# Patient Record
Sex: FEMALE | URBAN - METROPOLITAN AREA
[De-identification: names, ages, dates, MRNs, and addresses within clinical notes are randomized per-mention and may not be internally consistent; named-entity substitution may affect disease eponyms.]

---

## 2024-12-04 ENCOUNTER — OFFICE VISIT (OUTPATIENT)
Dept: URGENT CARE | Age: 44
End: 2024-12-04
Payer: COMMERCIAL

## 2024-12-04 VITALS
WEIGHT: 260 LBS | HEART RATE: 105 BPM | RESPIRATION RATE: 16 BRPM | DIASTOLIC BLOOD PRESSURE: 88 MMHG | SYSTOLIC BLOOD PRESSURE: 160 MMHG | OXYGEN SATURATION: 98 % | HEIGHT: 64 IN | BODY MASS INDEX: 44.39 KG/M2 | TEMPERATURE: 98.7 F

## 2024-12-04 DIAGNOSIS — J06.9 VIRAL UPPER RESPIRATORY TRACT INFECTION: Primary | ICD-10-CM

## 2024-12-04 DIAGNOSIS — A08.4 VIRAL GASTROENTERITIS: ICD-10-CM

## 2024-12-04 DIAGNOSIS — R03.0 ELEVATED BLOOD PRESSURE READING WITHOUT DIAGNOSIS OF HYPERTENSION: ICD-10-CM

## 2024-12-04 DIAGNOSIS — R05.9 COUGH, UNSPECIFIED TYPE: ICD-10-CM

## 2024-12-04 LAB
POC BINAX EXPIRATION: 0
POC BINAX NOW COVID SERIAL NUMBER: 0
POC RAPID INFLUENZA A: NEGATIVE
POC RAPID INFLUENZA B: NEGATIVE
POC RAPID STREP: NEGATIVE
POC SARS-COV-2 AG BINAX: NORMAL

## 2024-12-04 RX ORDER — LEVOTHYROXINE SODIUM 150 UG/1
150 TABLET ORAL DAILY
COMMUNITY

## 2024-12-04 RX ORDER — PREDNISONE 20 MG/1
20 TABLET ORAL DAILY
Qty: 5 TABLET | Refills: 0 | Status: SHIPPED | OUTPATIENT
Start: 2024-12-04 | End: 2024-12-09

## 2024-12-04 RX ORDER — NORGESTIMATE AND ETHINYL ESTRADIOL 0.25-0.035
1 KIT ORAL DAILY
COMMUNITY

## 2024-12-04 RX ORDER — ALBUTEROL SULFATE 90 UG/1
2 INHALANT RESPIRATORY (INHALATION) EVERY 6 HOURS PRN
Qty: 18 G | Refills: 0 | Status: SHIPPED | OUTPATIENT
Start: 2024-12-04 | End: 2024-12-29

## 2024-12-04 RX ORDER — RISANKIZUMAB-RZAA 150 MG/ML
INJECTION SUBCUTANEOUS
COMMUNITY
Start: 2023-12-08

## 2024-12-04 NOTE — LETTER
December 4, 2024     Patient: Anjelica Velasquez   YOB: 1980   Date of Visit: 12/4/2024       To Whom It May Concern:    Anjelica Velasquez was seen in my clinic on 12/4/2024 at 9:20 am. Please excuse Anjelica for her absence from work on this day to make the appointment.    If you have any questions or concerns, please don't hesitate to call.         Sincerely,         Adalberto Hopkins,         CC: No Recipients

## 2024-12-04 NOTE — PATIENT INSTRUCTIONS
We advise close follow-up with primary care provider regarding acute upper respiratory symptoms as well as elevated blood pressure without diagnosis of hypertension. We advised seeking immediate emergency medical attention if symptoms fail to improve, worsen or any concerning symptoms arise. Patient voiced full understanding and agreement to plan.

## 2024-12-04 NOTE — PROGRESS NOTES
"Subjective   Patient ID: Anjelica Velasquez is a 44 y.o. female. They present today with a chief complaint of Sinus Problem (X 5 days began with post nasal drip. Today facial pain, pain in teeth), Cough (X 4 days), Diarrhea (X 1 day), and Sore Throat (In mornings).    History of Present Illness  Anjelica is a 44-year-old female who presents to the urgent care for evaluation of cough, facial pain, sinus congestion for about 4 days duration and sore throat in the mornings.  Patient also describes 1 day of diarrhea with at least 6 watery stools without blood in her mucus.  Patient states she is trialed some over-the-counter remedies without significant improvement of symptoms.  Patient denies chest pain or difficulty breathing.  Patient notes some occasional shortness of breath with excessive coughing.  Patient denies fever, abdominal pain, vomiting.  Patient is requesting evaluation reassurance.  No other symptoms or concerns otherwise reported.    Past Medical History  Allergies as of 12/04/2024 - Reviewed 12/04/2024   Allergen Reaction Noted    Levofloxacin Hives and Rash 12/20/2011       (Not in a hospital admission)       No past medical history on file.    No past surgical history on file.     reports that she has quit smoking. Her smoking use included cigarettes. She has never used smokeless tobacco.    Review of Systems  A 10-point review of systems was performed, otherwise unremarkable unless stated in the history of present illness.                Objective    Vitals:    12/04/24 1144   BP: 160/88   BP Location: Left arm   Patient Position: Sitting   BP Cuff Size: Large adult   Pulse: 105   Resp: 16   Temp: 37.1 °C (98.7 °F)   TempSrc: Oral   SpO2: 98%   Weight: 118 kg (260 lb)   Height: 1.613 m (5' 3.5\")     No LMP recorded.    Gen: Vitals noted and reviewed, no evidence of acute distress, well developed and afebrile.   Psych: Mood and affect appropriate for setting.  Head/Face: Atraumatic and normocephalic. "   Neuro: No focal deficits noted.  ENT: TMs clear bilaterally, EACs unremarkable. +TTP over bilateral frontal and maxillary sinuses, mastoids non-tender. Posterior oropharynx with minimal erythema, without exudate, or swelling. Uvula is in the midline and non-edematous.   Neck: Supple. No meningismus through full range of motion. No lymphadenopathy.   Cardiac: Tachycardic rate with a regular rhythm and no murmur.   Lungs: Clear to auscultation throughout, No evidence of wheezing, rhonchi or crackles. Good aeration throughout.   Abdomen: Soft, non-tender throughout. Normoactive bowel sounds. No evidence of palpable masses    Extremities: Symmetrical, No peripheral edema  Skin: Without evidence of ecchymosis, wounds, or rashes.      Point of Care Test & Imaging Results from this visit  Results for orders placed or performed in visit on 12/04/24   POCT Covid-19 Rapid Antigen   Result Value Ref Range    Binax NOW Covid Serial Number 0     BINAX NOW Covid Expiration 0     POC KALEN-COV-2 AG  Presumptive negative test for SARS-CoV-2 (no antigen detected)     Presumptive negative test for SARS-CoV-2 (no antigen detected)   POCT Influenza A/B manually resulted   Result Value Ref Range    POC Rapid Influenza A Negative Negative    POC Rapid Influenza B Negative Negative   POCT rapid strep A manually resulted   Result Value Ref Range    POC Rapid Strep Negative Negative      No results found.    Diagnostic study results (if any) were reviewed by Adalberto Hopkins DO.    Assessment/Plan   Allergies, medications, history, and pertinent labs/EKGs/Imaging reviewed by Adalberto Hopkins DO.     Medical Decision Making  Discussed with the patient symptoms and clinical presentation findings suggestive of an acute viral upper respiratory illness with possible viral gastroenteritis of unclear etiology.  We advise close symptom monitoring supportive treatment and use of over-the-counter remedies for added symptom relief.  We agreed to prescribe  albuterol inhaler along with short course of prednisone for added symptom relief.  We advised continued use of over-the-counter remedies as needed and to closely monitor symptoms and hydrate adequately and adhere to a BRAT diet. We advise close follow-up with primary care provider regarding acute upper respiratory symptoms as well as elevated blood pressure without diagnosis of hypertension. We advised seeking immediate emergency medical attention if symptoms fail to improve, worsen or any concerning symptoms arise. Patient voiced full understanding and agreement to plan.    Orders and Diagnoses  Diagnoses and all orders for this visit:  Viral upper respiratory tract infection  -     predniSONE (Deltasone) 20 mg tablet; Take 1 tablet (20 mg) by mouth once daily for 5 days. Take with food.  -     albuterol 90 mcg/actuation inhaler; Inhale 2 puffs every 6 hours if needed for wheezing (Cough) for up to 25 days.  Cough, unspecified type  -     POCT Covid-19 Rapid Antigen  -     POCT Influenza A/B manually resulted  -     POCT rapid strep A manually resulted  -     predniSONE (Deltasone) 20 mg tablet; Take 1 tablet (20 mg) by mouth once daily for 5 days. Take with food.  -     albuterol 90 mcg/actuation inhaler; Inhale 2 puffs every 6 hours if needed for wheezing (Cough) for up to 25 days.  Viral gastroenteritis  Elevated blood pressure reading without diagnosis of hypertension      Medical Admin Record      Patient disposition: Home    Electronically signed by Adalberto Hopkins DO  12:15 PM

## 2024-12-09 ENCOUNTER — OFFICE VISIT (OUTPATIENT)
Dept: URGENT CARE | Age: 44
End: 2024-12-09
Payer: COMMERCIAL

## 2024-12-09 VITALS
WEIGHT: 260 LBS | TEMPERATURE: 99 F | HEIGHT: 63 IN | BODY MASS INDEX: 46.07 KG/M2 | OXYGEN SATURATION: 95 % | RESPIRATION RATE: 18 BRPM | HEART RATE: 100 BPM | DIASTOLIC BLOOD PRESSURE: 96 MMHG | SYSTOLIC BLOOD PRESSURE: 160 MMHG

## 2024-12-09 DIAGNOSIS — J01.90 ACUTE SINUSITIS, RECURRENCE NOT SPECIFIED, UNSPECIFIED LOCATION: Primary | ICD-10-CM

## 2024-12-09 PROCEDURE — 99213 OFFICE O/P EST LOW 20 MIN: CPT | Performed by: FAMILY MEDICINE

## 2024-12-09 RX ORDER — FLUCONAZOLE 150 MG/1
150 TABLET ORAL SEE ADMIN INSTRUCTIONS
Qty: 2 TABLET | Refills: 0 | Status: SHIPPED | OUTPATIENT
Start: 2024-12-09 | End: 2024-12-10

## 2024-12-09 RX ORDER — AZITHROMYCIN 250 MG/1
TABLET, FILM COATED ORAL
Qty: 6 TABLET | Refills: 0 | Status: SHIPPED | OUTPATIENT
Start: 2024-12-09

## 2024-12-09 ASSESSMENT — PAIN SCALES - GENERAL: PAINLEVEL_OUTOF10: 0-NO PAIN

## 2024-12-09 NOTE — PROGRESS NOTES
Subjective   Patient ID: Anjelica Velasquez is a 44 y.o. female. They present today with a chief complaint of URI (8 days/Cough, congestion, sinus pain, headache, fatigue, diarrhea).    Patient disposition: Home    History of Present Illness  HPI  Cough, congestion, sinus pressure, headache, fatigue and diarrhea for the past 8 days.  Patient was seen here 3 to 4 days ago with the same symptoms.  Was given anti-inflammatory medication steroid and completed the course with no improvement of symptoms.  Symptoms feel that they are worsening.  Continues to feel pressure and congestion.  Over-the-counter medication also taken.  No history of asthma or bronchitis.  Coughing fits causing abdominal soreness.  No other complaints or symptoms.    Past Medical History  Allergies as of 12/09/2024 - Reviewed 12/09/2024   Allergen Reaction Noted    Levaquin [levofloxacin] Hives and Rash 12/20/2011       (Not in a hospital admission)       Current Outpatient Medications   Medication Sig Dispense Refill    albuterol 90 mcg/actuation inhaler Inhale 2 puffs every 6 hours if needed for wheezing (Cough) for up to 25 days. 18 g 0    levothyroxine (Synthroid, Levoxyl) 150 mcg tablet Take 150 mcg by mouth once daily.      Lina 0.25-35 mg-mcg tablet Take 1 tablet by mouth once daily.      Skyrizi 150 mg/mL pen injector pen       predniSONE (Deltasone) 20 mg tablet Take 1 tablet (20 mg) by mouth once daily for 5 days. Take with food. (Patient not taking: Reported on 12/9/2024) 5 tablet 0     No current facility-administered medications for this visit.       There is no problem list on file for this patient.      History reviewed. No pertinent surgical history.     reports that she has quit smoking. Her smoking use included cigarettes. She has never used smokeless tobacco.    Review of Systems  As noted in HPI. ROS otherwise negative unless noted.       Objective    Vitals:    12/09/24 1038   BP: (!) 160/96   BP Location: Right arm   Patient  "Position: Sitting   BP Cuff Size: Large adult   Pulse: 100   Resp: 18   Temp: 37.2 °C (99 °F)   TempSrc: Oral   SpO2: 95%   Weight: 118 kg (260 lb)   Height: 1.6 m (5' 3\")     Patient's last menstrual period was 11/20/2024 (approximate).    Physical Exam  Constitutional: vital signs reviewed. Well developed, well nourished. patient alert and patient without distress.   Head and Face: Normal and atraumatic.  Palpation of the face and sinuses: Pressure  Ears, Nose, Mouth, and Throat:   Hearing: Normal.  External inspection of nose: Normal.   Lips, teeth, tongue and gums: Normal and well hydrated. External inspection of ears: Normal. Ear canals and TMs: Normal.  Posterior pharynx moist, no exudate, symmetric, no abscess, and with post nasal drip.  Nasal mucosa: Congested, inflamed  Neck: No neck mass was observed. Supple. normal muscle tone.   Cardiovascular: Heart rate normal, normal S1 and S2, no gallops, no murmurs and no pericardial rub. Rhythm: Normal.  Pulmonary: No respiratory distress. Palpation of chest: Normal. Clear bilateral breath sounds.   Lymphatic: No cervical lymphadenopathy  Psych: Normal mood and affect        Procedures  Notes reviewed from previous urgent care visit.  Labs testing were reviewed.  Point of Care Test & Imaging Results from this visit  Results for orders placed or performed in visit on 12/04/24   POCT rapid strep A manually resulted    Collection Time: 12/04/24 12:02 PM   Result Value Ref Range    POC Rapid Strep Negative Negative   POCT Covid-19 Rapid Antigen    Collection Time: 12/04/24 12:04 PM   Result Value Ref Range    Binax NOW Covid Serial Number 0     BINAX NOW Covid Expiration 0     POC KALEN-COV-2 AG  Presumptive negative test for SARS-CoV-2 (no antigen detected)     Presumptive negative test for SARS-CoV-2 (no antigen detected)   POCT Influenza A/B manually resulted    Collection Time: 12/04/24 12:04 PM   Result Value Ref Range    POC Rapid Influenza A Negative Negative    " POC Rapid Influenza B Negative Negative            Diagnostic study results (if any) were reviewed.    Assessment/Plan   Allergies, medications, history, and pertinent labs/EKGs/Imaging reviewed.    Medical Decision Making  See note    Orders and Diagnoses  There are no diagnoses linked to this encounter.    Medical Admin Record      Follow Up Instructions  No follow-ups on file.    At time of discharge patient was clinically well-appearing and HDS for outpatient management. The patient and/or family was educated regarding diagnosis, supportive care, OTC and Rx medications. The patient and/or family was given the opportunity to ask questions prior to discharge and all questions answered. They verbalized understanding of my discussion of the plans for treatment, expected course, indications to return to  or seek further evaluation in ED, and the need for timely follow up as directed.      Electronically signed by [facility]

## 2024-12-09 NOTE — PATIENT INSTRUCTIONS
Drink plenty of fluids to help keep your mucus thin.  Apply moist heat (using a hot, damp towel) to your face for 5 to 10 minutes, several times a day.  Breathe warm, moist air from a steamy shower, a hot bath, or a sink filled with hot water. You can drink warm drinks such as tea.   Avoid extremely cool, dry air. Consider using a humidifier to increase the moisture in the air in your home.  Use saltwater nasal washes(saline irrigation) to help keep the nasal passages open and wash out mucus and bacteria.    You may use over-the-counter anti-inflammatory such as ibuprofen, Advil, Aleve for pain. For inflammation, ibuprofen doses include 400-600 mg 2-3 times per day. Tylenol is acceptable to use for pain.    Cough Suppressants or expectorants may be taken over-the-counter if you are having cough with your symptoms. These include Delsym, Robitussin-DM,    You will be started on antibiotic which will be sent to the pharmacy; please complete the regimen as directed even if your symptoms improve. It is recommended to take an Probiotic while on this medication to reduce symptoms of upset stomach, diarrhea, and in women, yeast infections.     Start taking a nasal steroid which may include: Flonase, Nasacort, or Nasonex and use as directed. These medications are now available over the counter.    Yeast medication given, use as directed

## 2024-12-21 ENCOUNTER — OFFICE VISIT (OUTPATIENT)
Dept: URGENT CARE | Age: 44
End: 2024-12-21
Payer: COMMERCIAL

## 2024-12-21 ENCOUNTER — ANCILLARY PROCEDURE (OUTPATIENT)
Dept: URGENT CARE | Age: 44
End: 2024-12-21
Payer: COMMERCIAL

## 2024-12-21 VITALS
TEMPERATURE: 98.3 F | DIASTOLIC BLOOD PRESSURE: 83 MMHG | HEART RATE: 92 BPM | SYSTOLIC BLOOD PRESSURE: 145 MMHG | WEIGHT: 260 LBS | OXYGEN SATURATION: 98 % | BODY MASS INDEX: 46.06 KG/M2 | RESPIRATION RATE: 20 BRPM

## 2024-12-21 DIAGNOSIS — R07.81 RIB PAIN: ICD-10-CM

## 2024-12-21 DIAGNOSIS — S23.41XA RIB SPRAIN, INITIAL ENCOUNTER: Primary | ICD-10-CM

## 2024-12-21 PROCEDURE — 71101 X-RAY EXAM UNILAT RIBS/CHEST: CPT | Mod: RIGHT SIDE | Performed by: NURSE PRACTITIONER

## 2024-12-21 RX ORDER — CYCLOBENZAPRINE HCL 10 MG
10 TABLET ORAL 2 TIMES DAILY PRN
Qty: 20 TABLET | Refills: 0 | Status: SHIPPED | OUTPATIENT
Start: 2024-12-21 | End: 2024-12-31

## 2024-12-21 NOTE — PROGRESS NOTES
Subjective   Patient ID: Anjelica Velasquez is a 44 y.o. female. They present today with a chief complaint of Injury (Sharp pain in the middle of back - pt gross snot know how ).    History of Present Illness    Injury    Patient presents to urgent care for complaints of right-sided rib pain and right upper back pain.  Patient states she has had URI symptoms for the past month.  Was seen here 12 days ago and was diagnosed with a sinus infection.  She was started on azithromycin and finished antibiotic.  Patient reports that she is still coughing but states that her symptoms are overall better.  Reports pain on the right side of her rib and back starting today.  She feels like she gets a spasm and reports discomfort with taking deep breaths.  She has tried taking IcyHot and ibuprofen.  Reports no improvement of pain.      Past Medical History  Allergies as of 12/21/2024 - Reviewed 12/21/2024   Allergen Reaction Noted    Levaquin [levofloxacin] Hives and Rash 12/20/2011       (Not in a hospital admission)       No past medical history on file.    No past surgical history on file.     reports that she has quit smoking. Her smoking use included cigarettes. She has never used smokeless tobacco.    Review of Systems  Review of Systems                               Objective    Vitals:    12/21/24 1550   BP: 145/83   Pulse: 92   Resp: 20   Temp: 36.8 °C (98.3 °F)   SpO2: 98%   Weight: 118 kg (260 lb)     Patient's last menstrual period was 11/20/2024 (approximate).    Physical Exam  Vitals reviewed.   Constitutional:       Appearance: Normal appearance.   HENT:      Head: Normocephalic.      Right Ear: Tympanic membrane, ear canal and external ear normal.      Left Ear: Tympanic membrane, ear canal and external ear normal.      Nose: Nose normal.      Mouth/Throat:      Pharynx: Oropharynx is clear.   Eyes:      Conjunctiva/sclera: Conjunctivae normal.   Cardiovascular:      Rate and Rhythm: Normal rate and regular rhythm.       Heart sounds: Normal heart sounds.   Pulmonary:      Effort: Pulmonary effort is normal.      Breath sounds: Normal breath sounds.   Chest:      Chest wall: Tenderness (right ribs) present. No crepitus.   Musculoskeletal:      Cervical back: Neck supple.   Skin:     General: Skin is warm and dry.   Neurological:      Mental Status: She is alert.         Procedures    Point of Care Test & Imaging Results from this visit  No results found for this visit on 12/21/24.   No results found.    Diagnostic study results (if any) were reviewed by AARON Lucio.    Assessment/Plan   Allergies, medications, history, and pertinent labs/EKGs/Imaging reviewed by AARON Lucio.     Medical Decision Making  Preliminary images removed, no acute fracture or consolidation noted. Patient was told final images are pending. We will call if her if needed. Will prescribe flexeril to help with pain. She was told to continue to use ibuprofen as needed for pain. If pain is worsening, if you develop shortness of breath or severe chest pain, then go to the ER.    At time of discharge patient was clinically well-appearing and HDS for outpatient management. The patient and/or family was educated regarding diagnosis, supportive care, OTC and Rx medications. The patient and/or family was given the opportunity to ask questions prior to discharge.  They verbalized understanding of my discussion of the plans for treatment, expected course, indications to return to  or seek further evaluation in ED, and the need for timely follow up as directed.   They were provided with a work/school excuse if requested.      Orders and Diagnoses  Diagnoses and all orders for this visit:  Rib pain  -     XR ribs right 2 views w chest pa or ap; Future      Medical Admin Record      Patient disposition: Home    Electronically signed by AARON Lucio  4:02 PM    === 12/21/24 ===    XR RIBS RIGHT 2 VIEWS WITH CHEST PA OR AP    -  Impression -  1.  No evidence of acute cardiopulmonary process.  2. No acute displaced rib fracture detected.      Signed by: Savage Lamar 12/21/2024 4:53 PM  Dictation workstation:   [id number]